# Patient Record
Sex: FEMALE | Race: WHITE
[De-identification: names, ages, dates, MRNs, and addresses within clinical notes are randomized per-mention and may not be internally consistent; named-entity substitution may affect disease eponyms.]

---
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
## 2018-06-11 ENCOUNTER — HOSPITAL ENCOUNTER (EMERGENCY)
Dept: HOSPITAL 43 - DL.ED | Age: 18
Discharge: HOME | End: 2018-06-11
Payer: MEDICAID

## 2018-06-11 VITALS — SYSTOLIC BLOOD PRESSURE: 110 MMHG | DIASTOLIC BLOOD PRESSURE: 60 MMHG

## 2018-06-11 DIAGNOSIS — R10.30: ICD-10-CM

## 2018-06-11 DIAGNOSIS — F17.210: ICD-10-CM

## 2018-06-11 DIAGNOSIS — O99.331: ICD-10-CM

## 2018-06-11 DIAGNOSIS — O99.89: Primary | ICD-10-CM

## 2018-06-11 DIAGNOSIS — Z3A.08: ICD-10-CM

## 2018-06-11 DIAGNOSIS — Z88.2: ICD-10-CM

## 2018-06-11 LAB
ANION GAP SERPL CALC-SCNC: 11.3 MMOL/L
CHLORIDE SERPL-SCNC: 106 MMOL/L (ref 101–111)
SODIUM SERPL-SCNC: 135 MMOL/L (ref 135–145)

## 2018-06-11 NOTE — EDM.PDOC
ED HPI GENERAL MEDICAL PROBLEM





- General


Chief Complaint: Abdominal Pain


Stated Complaint: LOWER ABD PAIN [id number]


Time Seen by Provider: 06/11/18 02:00


Source of Information: Reports: Patient


History Limitations: Reports: No Limitations





- History of Present Illness


INITIAL COMMENTS - FREE TEXT/NARRATIVE: 





This 16 yo female patient reports to the ED with lower abdominal pain. The 

patient reports her symptoms started after having sexual intercourse. The 

patient has also reported some pink vaginal discharge. The patient reports she 

is approximately 8 weeks into her current pregnancy. The patient reports that 

this is her first pregnancy. The patient states that she has been having some 

cramping throughout the pregnancy, but the cramping had gotten better before 

tonight. 


Onset: Today


Duration: Hour(s):, Improving


Location: Reports: Abdomen (left lower abdomen), Pelvis


Quality: Reports: Ache, Pressure


Severity: Moderate


Improves with: Reports: None


Worsens with: Reports: None


Context: Reports: Other


Associated Symptoms: Reports: No Other Symptoms


  ** Uterine


Pain Score (Numeric/FACES): 10





- Related Data


 Allergies











Allergy/AdvReac Type Severity Reaction Status Date / Time


 


Sulfa (Sulfonamide Allergy  Cannot Verified 06/11/18 01:14





Antibiotics)   Remember  











Home Meds: 


 Home Meds





Pnv No.122/Iron/Folic Acid [Prenatal Multi Tablet] 1 tab PO DAILY 06/11/18 [

History]











Past Medical History





- Past Health History


Medical/Surgical History: Denies Medical/Surgical History


HEENT History: Reports: None


Cardiovascular History: Reports: None


Respiratory History: Reports: None


Gastrointestinal History: Reports: None


Genitourinary History: Reports: None


OB/GYN History: Reports: Pregnancy


Other OB/BYN History: states miscarriage x3


Musculoskeletal History: Reports: None


Neurological History: Reports: None


Psychiatric History: Reports: Anxiety, Depression


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


Endocrine/Metabolic History: Reports: None


Hematologic History: Reports: None


Immunologic History: Reports: None


Oncologic (Cancer) History: Reports: None


Dermatologic History: Reports: None





- Infectious Disease History


Infectious Disease History: Reports: None





- Past Surgical History


Head Surgeries/Procedures: Reports: None





Social & Family History





- Family History


Family Medical History: Noncontributory





- Tobacco Use


Smoking Status *Q: Current Every Day Smoker


Years of Tobacco use: 4


Packs/Tins Daily: 0.5





- Caffeine Use


Caffeine Use: Reports: None





- Recreational Drug Use


Recreational Drug Use: No





ED ROS GENERAL





- Review of Systems


Review Of Systems: ROS reveals no pertinent complaints other than HPI.





ED EXAM PREGNANCY





- Physical Exam


Exam: See Below


Exam Limited By: No Limitations


General Appearance: Alert, WD/WN, Mild Distress


Eye Exam: Bilateral Eye: EOMI, Normal Inspection, PERRL


Ears: Normal External Exam, Normal Canal, Hearing Grossly Normal, Normal TMs


Nose: Normal Inspection, Normal Mucosa, No Blood


Throat/Mouth: Normal Inspection, Normal Lips, Normal Teeth, Normal Gums, Normal 

Oropharynx, Normal Voice, No Airway Compromise


Head: Atraumatic, Normocephalic


Neck: Normal Inspection, Supple, Non-Tender, Full Range of Motion


Respiratory/Chest: No Respiratory Distress, Lungs Clear, Normal Breath Sounds, 

No Accessory Muscle Use, Chest Non-Tender


Cardiovascular: Normal Peripheral Pulses, Regular Rate, Rhythm, No Edema, No 

Gallop, No JVD, No Murmur, No Rub


GI/Abdominal Exam: Normal Bowel Sounds, Soft, Tender (LLQ mild tenderness to 

palpation)


Rectal Exam: Deferred


Back Exam: Normal Inspection, Full Range of Motion, NT


Extremities: Normal Inspection, Normal Range of Motion, Non-Tender, Normal 

Capillary Refill, No Pedal Edema


Neurological: Alert, Oriented, CN II-XII Intact, Normal Cognition, Normal Gait, 

Normal Reflexes, No Motor/Sensory Deficits


Psychiatric: Normal Affect, Normal Mood


Skin Exam: Warm, Dry, Intact, Normal Color, No Rash


Lymphatic: No Adenopathy





Course





- Vital Signs


Last Recorded V/S: 


 Last Vital Signs











Temp  36.6 C   06/11/18 01:12


 


Pulse  82   06/11/18 01:12


 


Resp  16   06/11/18 01:12


 


BP  110/60   06/11/18 01:12


 


Pulse Ox  100   06/11/18 01:12














- Orders/Labs/Meds


Orders: 


 Active Orders 24 hr











 Category Date Time Status


 


 OB Ltd 1 or More Fetus [US] Urgent Exams  06/11/18 02:41 Ordered


 


 OB Transvaginal [US] Urgent Exams  06/11/18 02:41 Ordered


 


 HCG QUALITATIVE,URINE [URCHEM] Stat Lab  06/11/18 01:43 Ordered


 


 UA W/MICROSCOPIC [URIN] Stat Lab  06/11/18 01:43 Ordered











Labs: 


 Laboratory Tests











  06/11/18 06/11/18 06/11/18 Range/Units





  01:43 01:43 01:43 


 


WBC     (5.0-10.0)  10^3/uL


 


RBC     (4.2-5.4)  10^6/uL


 


Hgb     (12.0-16.0)  g/dL


 


Hct     (37.0-47.0)  %


 


MCV     ()  fL


 


MCH     (27.0-34.0)  pg


 


MCHC     (33.0-35.0)  g/dL


 


Plt Count     (150-450)  10^3/uL


 


Neut % (Auto)     (42.2-75.2)  %


 


Lymph % (Auto)     (20.5-50.1)  %


 


Mono % (Auto)     (2-8)  %


 


Eos % (Auto)     (1.0-3.0)  %


 


Baso % (Auto)     (0.0-1.0)  %


 


Sodium     (135-145)  mmol/L


 


Potassium     (3.6-5.0)  mmol/L


 


Chloride     (101-111)  mmol/L


 


Carbon Dioxide     (21.0-31.0)  mmol/L


 


Anion Gap     


 


BUN     (7-18)  mg/dL


 


Creatinine     (0.6-1.3)  mg/dL


 


Est Cr Clr Drug Dosing     mL/min


 


Estimated GFR (MDRD)     


 


BUN/Creatinine Ratio     


 


Glucose     ()  mg/dL


 


Calcium     (8.4-10.2)  mg/dl


 


Total Bilirubin     (0.2-1.0)  mg/dL


 


AST     (10-42)  IU/L


 


ALT     (10-60)  IU/L


 


Alkaline Phosphatase     ()  IU/L


 


Total Protein     (6.7-8.2)  g/dl


 


Albumin     (3.2-5.5)  g/dl


 


Globulin     


 


Albumin/Globulin Ratio     


 


HCG, Quant     (0-25)  mIU/ml


 


Beta HCG, Quant     mIU/ml


 


Urine Color  Yellow    (YELLOW)  


 


Urine Appearance  Slightly cloudy    (CLEAR)  


 


Urine pH  7.5    (5.0-9.0)  


 


Ur Specific Gravity  1.015    (1.005-1.030)  


 


Urine Protein  Negative    (NEGATIVE)  


 


Urine Glucose (UA)  Negative    (NEGATIVE)  


 


Urine Ketones  Negative    (NEGATIVE)  


 


Urine Occult Blood  Small H    (NEGATIVE)  


 


Urine Nitrite  Negative    (NEGATIVE)  


 


Urine Bilirubin  Negative    (NEGATIVE)  


 


Urine Urobilinogen  0.2    (0.2-1.0)  mg/dL


 


Ur Leukocyte Esterase  Negative    (NEGATIVE)  


 


Urine RBC  0-5    /HPF


 


Urine WBC  0-5    (0-5/HPF)  /HPF


 


Ur Epithelial Cells  Few    /HPF


 


Amorphous Sediment  Moderate H    (0/HPF)  /HPF


 


Urine Bacteria  Moderate H    (0-FEW/HPF)  /HPF


 


Urine HCG, Qual   Positive   


 


Urine Opiates Screen    Negative  (NEGATIVE)  


 


Ur Oxycodone Screen    Negative  (NEGATIVE)  


 


Urine Methadone Screen    Negative  (NEGATIVE)  


 


Ur Barbiturates Screen    Negative  (NEGATIVE)  


 


U Tricyclic Antidepress    Negative  (NEGATIVE)  


 


Ur Phencyclidine Scrn    Negative  (NEGATIVE)  


 


Ur Amphetamine Screen    Negative  (NEGATIVE)  


 


U Methamphetamines Scrn    Negative  (NEGATIVE)  


 


Urine MDMA Screen    Negative  (NEGATIVE)  


 


U Benzodiazepines Scrn    Negative  (NEGATIVE)  


 


Urine Cocaine Screen    Negative  (NEGATIVE)  


 


U Marijuana (THC) Screen    Negative  (NEGATIVE)  














  06/11/18 06/11/18 06/11/18 Range/Units





  02:15 02:15 02:15 


 


WBC   14.8 H   (5.0-10.0)  10^3/uL


 


RBC   4.30   (4.2-5.4)  10^6/uL


 


Hgb   13.3   (12.0-16.0)  g/dL


 


Hct   37.7   (37.0-47.0)  %


 


MCV   87.7   ()  fL


 


MCH   30.9   (27.0-34.0)  pg


 


MCHC   35.3 H   (33.0-35.0)  g/dL


 


Plt Count   235   (150-450)  10^3/uL


 


Neut % (Auto)   77.8 H   (42.2-75.2)  %


 


Lymph % (Auto)   15.0 L   (20.5-50.1)  %


 


Mono % (Auto)   6.4   (2-8)  %


 


Eos % (Auto)   0.7 L   (1.0-3.0)  %


 


Baso % (Auto)   0.1   (0.0-1.0)  %


 


Sodium    135  (135-145)  mmol/L


 


Potassium    3.3 L  (3.6-5.0)  mmol/L


 


Chloride    106  (101-111)  mmol/L


 


Carbon Dioxide    21.0  (21.0-31.0)  mmol/L


 


Anion Gap    11.3  


 


BUN    7  (7-18)  mg/dL


 


Creatinine    0.5 L  (0.6-1.3)  mg/dL


 


Est Cr Clr Drug Dosing    156.27  mL/min


 


Estimated GFR (MDRD)    > 60  


 


BUN/Creatinine Ratio    14.00  


 


Glucose    90  ()  mg/dL


 


Calcium    9.3  (8.4-10.2)  mg/dl


 


Total Bilirubin    1.1 H  (0.2-1.0)  mg/dL


 


AST    20  (10-42)  IU/L


 


ALT    17  (10-60)  IU/L


 


Alkaline Phosphatase    33 L  ()  IU/L


 


Total Protein    7.2  (6.7-8.2)  g/dl


 


Albumin    4.3  (3.2-5.5)  g/dl


 


Globulin    2.9  


 


Albumin/Globulin Ratio    1.48  


 


HCG, Quant  > 1358 H    (0-25)  mIU/ml


 


Beta HCG, Quant  965667    mIU/ml


 


Urine Color     (YELLOW)  


 


Urine Appearance     (CLEAR)  


 


Urine pH     (5.0-9.0)  


 


Ur Specific Gravity     (1.005-1.030)  


 


Urine Protein     (NEGATIVE)  


 


Urine Glucose (UA)     (NEGATIVE)  


 


Urine Ketones     (NEGATIVE)  


 


Urine Occult Blood     (NEGATIVE)  


 


Urine Nitrite     (NEGATIVE)  


 


Urine Bilirubin     (NEGATIVE)  


 


Urine Urobilinogen     (0.2-1.0)  mg/dL


 


Ur Leukocyte Esterase     (NEGATIVE)  


 


Urine RBC     /HPF


 


Urine WBC     (0-5/HPF)  /HPF


 


Ur Epithelial Cells     /HPF


 


Amorphous Sediment     (0/HPF)  /HPF


 


Urine Bacteria     (0-FEW/HPF)  /HPF


 


Urine HCG, Qual     


 


Urine Opiates Screen     (NEGATIVE)  


 


Ur Oxycodone Screen     (NEGATIVE)  


 


Urine Methadone Screen     (NEGATIVE)  


 


Ur Barbiturates Screen     (NEGATIVE)  


 


U Tricyclic Antidepress     (NEGATIVE)  


 


Ur Phencyclidine Scrn     (NEGATIVE)  


 


Ur Amphetamine Screen     (NEGATIVE)  


 


U Methamphetamines Scrn     (NEGATIVE)  


 


Urine MDMA Screen     (NEGATIVE)  


 


U Benzodiazepines Scrn     (NEGATIVE)  


 


Urine Cocaine Screen     (NEGATIVE)  


 


U Marijuana (THC) Screen     (NEGATIVE)  














Departure





- Departure


Time of Disposition: 04:08


Disposition: Home, Self-Care 01


Condition: Fair


Clinical Impression: 


 Abdominal pain during pregnancy in first trimester





Pregnancy


Qualifiers:


 Weeks of gestation: 8 weeks Qualified Code(s): Z3A.08 - 8 weeks gestation of 

pregnancy








- Discharge Information


Instructions:  Abdominal Pain During Pregnancy, Easy-to-Read


Forms:  ED Department Discharge


Care Plan Goals: 


The patient was advised of the examination, lab and ultrasound results during 

the visit. The patient was encouraged to continue to monitor for any additional 

symptoms. If the patient has any additional symptoms or concerns, the patient 

should follow-up with her primary care facility or return to the emergency 

department. 





- My Orders


Last 24 Hours: 


My Active Orders





06/11/18 01:43


HCG QUALITATIVE,URINE [URCHEM] Stat 


UA W/MICROSCOPIC [URIN] Stat 





06/11/18 02:41


OB Ltd 1 or More Fetus [US] Urgent 


OB Transvaginal [US] Urgent 














- Assessment/Plan


Last 24 Hours: 


My Active Orders





06/11/18 01:43


HCG QUALITATIVE,URINE [URCHEM] Stat 


UA W/MICROSCOPIC [URIN] Stat 





06/11/18 02:41


OB Ltd 1 or More Fetus [US] Urgent 


OB Transvaginal [US] Urgent

## 2019-04-29 ENCOUNTER — HOSPITAL ENCOUNTER (EMERGENCY)
Dept: HOSPITAL 43 - DL.ED | Age: 19
Discharge: HOME | End: 2019-04-29
Payer: MEDICAID

## 2019-04-29 VITALS — DIASTOLIC BLOOD PRESSURE: 82 MMHG | SYSTOLIC BLOOD PRESSURE: 126 MMHG
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
## 2019-04-29 DIAGNOSIS — S60.221A: Primary | ICD-10-CM

## 2019-04-29 DIAGNOSIS — Z88.2: ICD-10-CM

## 2019-04-29 DIAGNOSIS — F17.210: ICD-10-CM

## 2019-04-29 NOTE — CR
Clinical history: 18-year-old female pain right hand (hit)

 

Interpretation:

 

Apparent mild soft tissue swelling dorsum of hand, over head of metacarpal

(lateral view). 

 

No sign of right hand fracture/dislocation.

 

No foreign bodies.

## 2019-04-29 NOTE — EDM.PDOC
ED HPI GENERAL MEDICAL PROBLEM





- General


Chief Complaint: Upper Extremity Injury/Pain


Stated Complaint: HIT HAND AND HURTS BAD [id number]


Time Seen by Provider: 04/29/19 09:02


Source of Information: Reports: Patient, RN, RN Notes Reviewed


History Limitations: Reports: No Limitations





- History of Present Illness


INITIAL COMMENTS - FREE TEXT/NARRATIVE: 


Patient presents to ER with complaint of pain to right hand. Patient states she 

punched/hit her car door last night. States elevating and icing hand. She rates 

the pain 10/10. She states possibility of pregnancy. 


Onset Date: 04/28/19


Duration: Getting Worse


Location: Reports: Upper Extremity, Right


Quality: Reports: Ache


Severity: Moderate


Improves with: Reports: None


Worsens with: Reports: None


Associated Symptoms: Reports: No Other Symptoms


  ** Right Hand


Pain Score (Numeric/FACES): 10





- Related Data


 Allergies











Allergy/AdvReac Type Severity Reaction Status Date / Time


 


Sulfa (Sulfonamide Allergy  Cannot Verified 04/29/19 08:53





Antibiotics)   Remember  











Home Meds: 


 Home Meds





. [No Known Home Meds]  04/29/19 [History]











Past Medical History





- Past Health History


Medical/Surgical History: Denies Medical/Surgical History


HEENT History: Reports: None


Cardiovascular History: Reports: None


Respiratory History: Reports: None


Gastrointestinal History: Reports: None


Genitourinary History: Reports: None


OB/GYN History: Reports: Pregnancy


Other OB/GYN History: states miscarriage x3


Musculoskeletal History: Reports: None


Neurological History: Reports: None


Psychiatric History: Reports: Anxiety, Depression


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


Endocrine/Metabolic History: Reports: None


Hematologic History: Reports: None


Immunologic History: Reports: None


Oncologic (Cancer) History: Reports: None


Dermatologic History: Reports: None





- Infectious Disease History


Infectious Disease History: Reports: None





- Past Surgical History


Head Surgeries/Procedures: Reports: None


HEENT Surgical History: Reports: Oral Surgery





Social & Family History





- Family History


Family Medical History: Noncontributory





- Tobacco Use


Smoking Status *Q: Current Every Day Smoker


Years of Tobacco use: 4


Packs/Tins Daily: 0.5





- Caffeine Use


Caffeine Use: Reports: None





- Recreational Drug Use


Recreational Drug Use: No





Review of Systems





- Review of Systems


Review Of Systems: ROS reveals no pertinent complaints other than HPI.





ED EXAM, GENERAL





- Physical Exam


Exam: See Below


Exam Limited By: No Limitations


General Appearance: Alert, WD/WN, No Apparent Distress


Eye Exam: Bilateral Eye: EOMI, Normal Inspection, PERRL


Ears: Normal External Exam, Normal Canal, Hearing Grossly Normal, Normal TMs


Nose: Normal Inspection, Normal Mucosa, No Blood


Throat/Mouth: Normal Inspection


Head: Atraumatic


Neck: Normal Inspection, Supple, Non-Tender, Full Range of Motion


Respiratory/Chest: Wheezing (throughout)


Cardiovascular: Normal Peripheral Pulses, Regular Rate, Rhythm, No Edema, No 

Gallop, No JVD, No Murmur, No Rub


GI/Abdominal: Normal Bowel Sounds, Soft, Non-Tender, No Organomegaly, No 

Distention, No Abnormal Bruit, No Mass


 (Female) Exam: Deferred


Rectal (Female) Exam: Deferred


Back Exam: Normal Inspection, Full Range of Motion, NT


Extremities: Other (decreased range of motion right hand.)


Neurological: Alert, Oriented, CN II-XII Intact, Normal Cognition, Normal Gait, 

Normal Reflexes, No Motor/Sensory Deficits


Psychiatric: Normal Affect, Normal Mood


Skin Exam: Warm, Dry, Intact, Normal Color, No Rash


Lymphatic: No Adenopathy





Course





- Vital Signs


Last Recorded V/S: 


 Last Vital Signs











Temp  97 F   04/29/19 08:54


 


Pulse  121 H  04/29/19 08:54


 


Resp  16   04/29/19 08:54


 


BP  126/82   04/29/19 08:54


 


Pulse Ox  100   04/29/19 08:54














- Orders/Labs/Meds


Orders: 


 Active Orders 24 hr











 Category Date Time Status


 


 CULTURE URINE [RM] Stat Lab  04/29/19 09:07 Received











Labs: 


 Laboratory Tests











  04/29/19 04/29/19 Range/Units





  09:07 09:07 


 


Urine Color  Yellow   (YELLOW)  


 


Urine Appearance  Slightly cloudy   (CLEAR)  


 


Urine pH  6.5   (5.0-9.0)  


 


Ur Specific Gravity  1.025   (1.005-1.030)  


 


Urine Protein  30 H   (NEGATIVE)  


 


Urine Glucose (UA)  Negative   (NEGATIVE)  


 


Urine Ketones  15 H   (NEGATIVE)  


 


Urine Occult Blood  Trace-intact H   (NEGATIVE)  


 


Urine Nitrite  Negative   (NEGATIVE)  


 


Urine Bilirubin  Small H   (NEGATIVE)  


 


Urine Urobilinogen  0.2   (0.2-1.0)  mg/dL


 


Ur Leukocyte Esterase  Small H   (NEGATIVE)  


 


Urine RBC  0-5   /HPF


 


Urine WBC  10-20 H   (0-5/HPF)  /HPF


 


Ur Epithelial Cells  Moderate H   /HPF


 


Urine Bacteria  Few   (0-FEW/HPF)  /HPF


 


Urine Mucus  Moderate H   /LPF


 


Urine HCG, Qual   Negative  














- Radiology Interpretation


Free Text/Narrative:: 


Right hand xray:


No acute findings


See rad report








Departure





- Departure


Time of Disposition: 10:13


Disposition: Home, Self-Care 01


Condition: Fair


Clinical Impression: 


Contusion of right hand


Qualifiers:


 Encounter type: initial encounter Qualified Code(s): S60.221A - Contusion of 

right hand, initial encounter








- Discharge Information


*PRESCRIPTION DRUG MONITORING PROGRAM REVIEWED*: No


*COPY OF PRESCRIPTION DRUG MONITORING REPORT IN PATIENT JUSTIN: No


Instructions:  Hand Contusion, Easy-to-Read


Referrals: 


PCP,None [Ordering Only Provider] - 


Forms:  ED Department Discharge


Additional Instructions: 


Elevate and ice as tolerated


May use Tylenol and/or Ibuprofen as directed for pain


Follow up with your primary care facility if no improvement








- My Orders


Last 24 Hours: 


My Active Orders





04/29/19 09:07


CULTURE URINE [RM] Stat 














- Assessment/Plan


Last 24 Hours: 


My Active Orders





04/29/19 09:07


CULTURE URINE [RM] Stat

## 2020-01-24 NOTE — EDM.PDOC
ED HPI GENERAL MEDICAL PROBLEM





- General


Chief Complaint: Lower Extremity Injury/Pain


Stated Complaint: CAN'T WALK ON ANKLE


Time Seen by Provider: 01/24/20 14:50


Source of Information: Reports: Patient, RN


History Limitations: Reports: No Limitations





- History of Present Illness


INITIAL COMMENTS - FREE TEXT/NARRATIVE: 


19 year old female who reports falling after being intoxicated with alcohol one 

day ago. She is not sure whether she sustained an inversion or eversion 

fracture. She reports pain on top of her left foot. She rates pain as 5/10 and 

described it as an ache. She is able to ambulate. She has not tried anything 

for pain. No previous left foot injury.


Onset: Gradual


Duration: Day(s): (1), Constant


Location: Reports: Lower Extremity, Left


Quality: Reports: Ache


Severity: Moderate


Improves with: Reports: None


Worsens with: Reports: None


Treatments PTA: Reports: Other (see below) (none)





- Related Data


 Allergies











Allergy/AdvReac Type Severity Reaction Status Date / Time


 


Sulfa (Sulfonamide Allergy  Cannot Verified 01/24/20 14:51





Antibiotics)   Remember  











Home Meds: 


 Home Meds





. [No Known Home Meds]  01/24/20 [History]











Past Medical History





- Past Health History


Medical/Surgical History: Denies Medical/Surgical History


HEENT History: Reports: None


Cardiovascular History: Reports: None


Respiratory History: Reports: None


Gastrointestinal History: Reports: None


Genitourinary History: Reports: None


OB/GYN History: Reports: Pregnancy


Other OB/GYN History: states miscarriage x3


Musculoskeletal History: Reports: None


Neurological History: Reports: None


Psychiatric History: Reports: Anxiety, Depression


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


Endocrine/Metabolic History: Reports: None


Hematologic History: Reports: None


Immunologic History: Reports: None


Oncologic (Cancer) History: Reports: None


Dermatologic History: Reports: None





- Infectious Disease History


Infectious Disease History: Reports: None





- Past Surgical History


Head Surgeries/Procedures: Reports: None


HEENT Surgical History: Reports: Oral Surgery





Social & Family History





- Family History


Family Medical History: Noncontributory





- Tobacco Use


Smoking Status *Q: Current Every Day Smoker


Years of Tobacco use: 5


Packs/Tins Daily: 0.5





- Caffeine Use


Caffeine Use: Reports: Coffee, Soda





Review of Systems





- Review of Systems


Review Of Systems: Comprehensive ROS is negative, except as noted in HPI.





ED EXAM, GENERAL





- Physical Exam


Exam: See Below


Exam Limited By: No Limitations


General Appearance: Alert, Mild Distress


Respiratory/Chest: No Respiratory Distress


Cardiovascular: Normal Peripheral Pulses


Peripheral Pulses: 3+: Posterior Tibial (L), Dorsalis Pedis (L)


Extremities: Normal Inspection (left), Normal Range of Motion (left), No Pedal 

Edema (left), Carlo's Sign (negtaive), Other (Mild swelling on the top on with 

left foot with no bruising or deformity)


Neurological: Alert, Oriented, Normal Gait, No Motor/Sensory Deficits


Psychiatric: Normal Affect, Normal Mood


Skin Exam: Warm, Intact


Lymphatic: No Adenopathy





Course





- Vital Signs


Last Recorded V/S: 


 Last Vital Signs











Temp  98.3 F   01/24/20 14:41


 


Pulse  109 H  01/24/20 14:41


 


Resp  16   01/24/20 14:41


 


BP  109/61   01/24/20 14:41


 


Pulse Ox  100   01/24/20 14:41














- Radiology Interpretation


Free Text/Narrative:: 


PROCEDURE INFORMATION:


Exam: XR Left Foot Complete


Exam date and time: 1/24/2020 3:08 PM


Age: 19 years old


Clinical indication: Pain; Foot; Left; Patient HX: Patient fell last night; 

Additional info: Pain and


swelling post fall


TECHNIQUE:


Imaging protocol: XR Left foot.


Views: 3 or more views.


COMPARISON:


No relevant prior studies available.


FINDINGS:


Bones/joints: The alignment of the joints is anatomic and the joint spaces are 

maintained. There is


no evidence of acute fracture.


Soft tissues: No radiopaque foreign bodies are identified. There are no soft 

tissue swelling or


calcifications.


IMPRESSION:


Normal appearing foot.








- Re-Assessments/Exams


Free Text/Narrative Re-Assessment/Exam: 


Ice applied on her left foot. Xray of left foot negative for fracture and 

dislocation. Xray result reviewed with patient. Ace wrap applied. Encouraged 

patient to apply ice and elevate her left foot when sitting or lying down. 

Instructions included in the AVS.








Departure





- Departure


Time of Disposition: 16:08


Disposition: Home, Self-Care 01


Condition: Fair


Clinical Impression: 


 Foot pain, left








- Discharge Information


Instructions:  Foot Sprain, Foot Pain


Referrals: 


Kian Sher MD [Primary Care Provider] - 


Forms:  ED Department Discharge


Additional Instructions: 


See instruction on after visit summary. Follow up with PCP if symptoms fail to 

improve.





Sepsis Event Note





- Evaluation


Sepsis Screening Result: No Definite Risk





- Focused Exam


Date Exam was Performed: 01/26/20


Time Exam was Performed: 08:40